# Patient Record
Sex: MALE | Race: WHITE | NOT HISPANIC OR LATINO | URBAN - METROPOLITAN AREA
[De-identification: names, ages, dates, MRNs, and addresses within clinical notes are randomized per-mention and may not be internally consistent; named-entity substitution may affect disease eponyms.]

---

## 2024-04-19 ENCOUNTER — OFFICE VISIT (OUTPATIENT)
Dept: URGENT CARE | Facility: URGENT CARE | Age: 24
End: 2024-04-19
Payer: COMMERCIAL

## 2024-04-19 VITALS
HEART RATE: 91 BPM | WEIGHT: 186.8 LBS | TEMPERATURE: 97.9 F | SYSTOLIC BLOOD PRESSURE: 108 MMHG | DIASTOLIC BLOOD PRESSURE: 73 MMHG | OXYGEN SATURATION: 95 %

## 2024-04-19 DIAGNOSIS — R07.0 THROAT PAIN: Primary | ICD-10-CM

## 2024-04-19 DIAGNOSIS — L04.9 LYMPHADENITIS, ACUTE: ICD-10-CM

## 2024-04-19 DIAGNOSIS — R50.9 FEVER AND CHILLS: ICD-10-CM

## 2024-04-19 DIAGNOSIS — J02.0 STREPTOCOCCAL SORE THROAT: ICD-10-CM

## 2024-04-19 LAB
DEPRECATED S PYO AG THROAT QL EIA: POSITIVE
FLUAV AG SPEC QL IA: NEGATIVE
FLUBV AG SPEC QL IA: NEGATIVE

## 2024-04-19 PROCEDURE — 99203 OFFICE O/P NEW LOW 30 MIN: CPT | Performed by: PHYSICIAN ASSISTANT

## 2024-04-19 PROCEDURE — 87880 STREP A ASSAY W/OPTIC: CPT | Performed by: PHYSICIAN ASSISTANT

## 2024-04-19 PROCEDURE — 87804 INFLUENZA ASSAY W/OPTIC: CPT | Performed by: PHYSICIAN ASSISTANT

## 2024-04-19 RX ORDER — AMOXICILLIN 875 MG
875 TABLET ORAL 2 TIMES DAILY
Qty: 20 TABLET | Refills: 0 | Status: SHIPPED | OUTPATIENT
Start: 2024-04-19 | End: 2024-04-29

## 2024-04-19 RX ORDER — IBUPROFEN 600 MG/1
600 TABLET, FILM COATED ORAL EVERY 6 HOURS PRN
Status: SHIPPED
Start: 2024-04-19 | End: 2025-04-19

## 2024-04-19 NOTE — PROGRESS NOTES
Assessment & Plan     Throat pain    Throat pain due to strep throat  Strep is POS  Phenol for throat pain  Motrin for throat pain and swelling  - Streptococcus A Rapid Screen w/Reflex to PCR - Clinic Collect  - Influenza A & B Antigen - Clinic Collect  - phenol (CHLORASEPTIC) 1.4 % spray; Take 1 spray (1 mL) by mouth every hour as needed for sore throat    Fever and chills    A fever is a high body temperature and is the body's reaction to an illness. It's one way your body fights illness. A temperature of up to 102 F can be helpful, because it helps the body respond to infection. Most healthy people can have a fever as high as 103 F to 104 F for short periods of time without problems.  Its important to stay well hydrated with a fever and avoid being in the heat.  A fever can be treated with medications provided, but If symptoms worsen then be seen by your PCP or go to the .     - Influenza A & B Antigen - Clinic Collect  - ibuprofen (ADVIL/MOTRIN) 600 MG tablet; Take 1 tablet (600 mg) by mouth every 6 hours as needed    Streptococcal sore throat    You have had a positive test for strep throat. Strep throat is a bacterial infection that can be spread to others. It's spread by coughing, kissing, sharing glasses or eating utensils, or by touching others after touching your mouth or nose. It's treated with antibiotic medicine. You should start to feel better in 1 to 2 days with treatment.  Strep throat is contagious for 24 hrs after starting antibiotics.     - amoxicillin (AMOXIL) 875 MG tablet; Take 1 tablet (875 mg) by mouth 2 times daily for 10 days  - ibuprofen (ADVIL/MOTRIN) 600 MG tablet; Take 1 tablet (600 mg) by mouth every 6 hours as needed    Lymphadenitis, acute    - amoxicillin (AMOXIL) 875 MG tablet; Take 1 tablet (875 mg) by mouth 2 times daily for 10 days    Review of external notes as documented elsewhere in note    Nicotine/Tobacco Cessation  He reports that he has been smoking cigarettes and  vaping device. He does not have any smokeless tobacco history on file.  Nicotine/Tobacco Cessation Plan      At today's visit with Saul Mosquera , we discussed results, diagnosis, medications and formulated a plan.  We also discussed red flags for immediate return to clinic/ER, as well as indications for follow up with PCP if not improved in 3 days. Patient understood and agreed to plan. Saul Mosquera was discharged with stable vitals and has no further questions.       No follow-ups on file.    Kristin Hughes is a 24 year old, presenting for the following health issues:  Urgent Care (Pt presents with throat pain, on and off ear ache, had a fever but not anymore.)    HPI       Review of Systems  Constitutional, HEENT, cardiovascular, pulmonary, gi and gu systems are negative, except as otherwise noted.      Objective    /73   Pulse 91   Temp 97.9  F (36.6  C) (Tympanic)   Wt 84.7 kg (186 lb 12.8 oz)   SpO2 95%   There is no height or weight on file to calculate BMI.  Physical Exam   GENERAL: alert and no distress  EYES: Eyes grossly normal to inspection, PERRL and conjunctivae and sclerae normal  HENT: normal cephalic/atraumatic, ear canals and TM's normal, nose and mouth without ulcers or lesions, tonsillar hypertrophy, tonsillar erythema, and tonsillar exudate  NECK: cervical adenopathy anterior  RESP: lungs clear to auscultation - no rales, rhonchi or wheezes  CV: regular rate and rhythm, normal S1 S2, no S3 or S4, no murmur, click or rub, no peripheral edema  MS: no gross musculoskeletal defects noted, no edema  SKIN: no suspicious lesions or rashes  NEURO: Normal strength and tone, mentation intact and speech normal  PSYCH: mentation appears normal, affect normal/bright    Results for orders placed or performed in visit on 04/19/24   Streptococcus A Rapid Screen w/Reflex to PCR - Clinic Collect     Status: Abnormal    Specimen: Throat; Swab   Result Value Ref Range    Group A Strep antigen  Positive (A) Negative   Influenza A & B Antigen - Clinic Collect     Status: Normal    Specimen: Nose; Swab   Result Value Ref Range    Influenza A antigen Negative Negative    Influenza B antigen Negative Negative    Narrative    Test results must be correlated with clinical data. If necessary, results should be confirmed by a molecular assay or viral culture.             Signed Electronically by: SURI Amanda, LUKE

## 2024-04-19 NOTE — LETTER
April 19, 2024      Saul Mosquera  [address] [id number]        To Whom It May Concern:    Saul Mosquera was seen in our clinic.  He was diagnosed with strep throat and is contagious for 24 hrs.     Sincerely,      Edward Mccarthy, [facility] PAC

## 2024-09-29 ENCOUNTER — OFFICE VISIT (OUTPATIENT)
Dept: URGENT CARE | Facility: URGENT CARE | Age: 24
End: 2024-09-29
Payer: COMMERCIAL

## 2024-09-29 VITALS
TEMPERATURE: 96.7 F | OXYGEN SATURATION: 97 % | HEART RATE: 85 BPM | WEIGHT: 192 LBS | DIASTOLIC BLOOD PRESSURE: 78 MMHG | SYSTOLIC BLOOD PRESSURE: 111 MMHG

## 2024-09-29 DIAGNOSIS — S09.90XA INJURY OF HEAD, INITIAL ENCOUNTER: Primary | ICD-10-CM

## 2024-09-29 PROCEDURE — 99215 OFFICE O/P EST HI 40 MIN: CPT | Performed by: PHYSICIAN ASSISTANT

## 2024-09-29 ASSESSMENT — ENCOUNTER SYMPTOMS
SEIZURES: 0
DIZZINESS: 0
SPEECH DIFFICULTY: 0
WEAKNESS: 0
SLEEP DISTURBANCE: 1
LIGHT-HEADEDNESS: 0
HEADACHES: 1
DYSPHORIC MOOD: 1
TREMORS: 0
NUMBNESS: 0
DECREASED CONCENTRATION: 0
HALLUCINATIONS: 0
PHOTOPHOBIA: 1
CONFUSION: 0

## 2024-09-29 NOTE — PROGRESS NOTES
Injury of head, initial encounter  I recommended that the patient be evaluated in the emergency department to rule out brain hemorrhage as his loss of consciousness was prolonged and he continues to have symptoms that are suspicious for this.  It is possible that his symptoms could just represent a severe concussion but I am unable to rule out the possibility of a brain bleed in the urgent care.  The patient refused to be evaluated in the emergency department but I continued to encourage him to do so.    Patient states that he will monitor for the next 48 hours and return to clinic in a couple days if symptoms improve on their own.  I told him I would not consider writing him a referral for concussion therapy unless he has shown improvement over the next 2 to 3 days.    Edward Rosenbaum PA-C  [facility]    Subjective   24 year old who presents to clinic today for the following health issues:    Fall       HPI     Patient visits today after falling this morning in the bathroom and woke up 4 hours later. He does not remember the fall and he does not remember hitting his head. He has a sore on back left side of head and mild headache. Patient is feeling more emotional and is having light sensitivity. He denies any history of falls, seizures, or LOC. Other than the light sensitivity, he is not having any other visual disturbances. Denies confusion or decreased concentration. He states that he is likely sleep deprived and dehydrated as he is a  and works unusual hours.     Patient states that he has had a concussion when he was younger. At the age of 14-15 during soccer. He states that this feels similar.     Review of Systems   Review of Systems   Eyes:  Positive for photophobia.   Neurological:  Positive for headaches. Negative for dizziness, tremors, seizures, speech difficulty, weakness, light-headedness and numbness.   Psychiatric/Behavioral:  Positive for dysphoric mood and  sleep disturbance. Negative for confusion, decreased concentration and hallucinations.       See HPI    Objective    Temp: (!) 96.7  F (35.9  C) Temp src: Tympanic BP: 111/78 Pulse: 85     SpO2: 97 %       Physical Exam   Physical Exam  Constitutional:       General: He is not in acute distress.     Appearance: Normal appearance. He is normal weight. He is not ill-appearing, toxic-appearing or diaphoretic.   HENT:      Head: Normocephalic and atraumatic.   Eyes:      Extraocular Movements: Extraocular movements intact.      Pupils: Pupils are equal, round, and reactive to light.   Cardiovascular:      Rate and Rhythm: Normal rate.      Pulses: Normal pulses.   Pulmonary:      Effort: Pulmonary effort is normal. No respiratory distress.   Neurological:      General: No focal deficit present.      Mental Status: He is alert and oriented to person, place, and time. Mental status is at baseline.      Cranial Nerves: No cranial nerve deficit.      Sensory: No sensory deficit.      Coordination: Coordination normal.      Gait: Gait normal.   Psychiatric:         Mood and Affect: Mood normal.         Behavior: Behavior normal.         Thought Content: Thought content normal.         Judgment: Judgment normal.          No results found for this or any previous visit (from the past 24 hour(s)).

## 2024-09-29 NOTE — PROGRESS NOTES
Patient presents with:  Fall: Fell this morning in the bathroom and woke up 4 hours later. Sore on back of head and mild headache.

## 2024-09-29 NOTE — LETTER
September 29, 2024      Saul Mosquera  [address] [address]        To Whom It May Concern:    Saul Mosquera  was seen on 9/29/24.  Please excuse him for his absence today and for the next 2 days while he recovers from and injury.        Sincerely,        Edward Rosenbaum PA-C